# Patient Record
Sex: FEMALE | Race: WHITE | NOT HISPANIC OR LATINO | Employment: OTHER | URBAN - NONMETROPOLITAN AREA
[De-identification: names, ages, dates, MRNs, and addresses within clinical notes are randomized per-mention and may not be internally consistent; named-entity substitution may affect disease eponyms.]

---

## 2019-04-11 ENCOUNTER — HISTORICAL (OUTPATIENT)
Dept: ADMINISTRATIVE | Facility: HOSPITAL | Age: 84
End: 2019-04-11

## 2020-09-15 ENCOUNTER — HISTORICAL (OUTPATIENT)
Dept: ADMINISTRATIVE | Facility: HOSPITAL | Age: 85
End: 2020-09-15

## 2020-12-29 ENCOUNTER — HISTORICAL (OUTPATIENT)
Dept: ADMINISTRATIVE | Facility: HOSPITAL | Age: 85
End: 2020-12-29

## 2022-04-10 ENCOUNTER — HISTORICAL (OUTPATIENT)
Dept: ADMINISTRATIVE | Facility: HOSPITAL | Age: 87
End: 2022-04-10

## 2022-04-29 VITALS
WEIGHT: 176.56 LBS | DIASTOLIC BLOOD PRESSURE: 76 MMHG | BODY MASS INDEX: 29.42 KG/M2 | SYSTOLIC BLOOD PRESSURE: 126 MMHG | HEIGHT: 65 IN

## 2022-10-12 ENCOUNTER — HISTORICAL (OUTPATIENT)
Dept: ADMINISTRATIVE | Facility: HOSPITAL | Age: 87
End: 2022-10-12

## 2023-07-06 ENCOUNTER — HOSPITAL ENCOUNTER (OUTPATIENT)
Dept: RADIOLOGY | Facility: HOSPITAL | Age: 88
Discharge: HOME | End: 2023-07-06
Attending: INTERNAL MEDICINE
Payer: MEDICARE

## 2023-07-06 DIAGNOSIS — R22.1 NECK MASS: ICD-10-CM

## 2023-07-06 PROCEDURE — 70491 CT SOFT TISSUE NECK W/DYE: CPT | Mod: TC

## 2023-07-06 PROCEDURE — 25500020 PHARM REV CODE 255: Performed by: INTERNAL MEDICINE

## 2023-07-06 RX ADMIN — IOPAMIDOL 90 ML: 755 INJECTION, SOLUTION INTRAVENOUS at 02:07

## 2023-08-03 DIAGNOSIS — D00.08 CARCINOMA IN SITU OF PHARYNX: Primary | ICD-10-CM

## 2023-10-13 ENCOUNTER — HOSPITAL ENCOUNTER (EMERGENCY)
Facility: HOSPITAL | Age: 88
Discharge: HOME | End: 2023-10-13
Attending: FAMILY MEDICINE
Payer: MEDICARE

## 2023-10-13 VITALS
HEIGHT: 65 IN | SYSTOLIC BLOOD PRESSURE: 157 MMHG | TEMPERATURE: 98 F | DIASTOLIC BLOOD PRESSURE: 73 MMHG | HEART RATE: 99 BPM | OXYGEN SATURATION: 97 % | RESPIRATION RATE: 20 BRPM | BODY MASS INDEX: 24.32 KG/M2 | WEIGHT: 146 LBS

## 2023-10-13 DIAGNOSIS — K94.20 GASTROSTOMY COMPLICATION: Primary | ICD-10-CM

## 2023-10-13 DIAGNOSIS — K94.23 PEG TUBE MALFUNCTION: ICD-10-CM

## 2023-10-13 LAB
ALBUMIN SERPL-MCNC: 3.7 G/DL (ref 3.4–5)
ALBUMIN/GLOB SERPL: 1.2 RATIO
ALP SERPL-CCNC: 106 UNIT/L (ref 50–144)
ALT SERPL-CCNC: 41 UNIT/L (ref 1–45)
ANION GAP SERPL CALC-SCNC: 4 MEQ/L (ref 2–13)
AST SERPL-CCNC: 36 UNIT/L (ref 14–36)
BASOPHILS # BLD AUTO: 0.01 X10(3)/MCL (ref 0.01–0.08)
BASOPHILS NFR BLD AUTO: 0.1 % (ref 0.1–1.2)
BILIRUB SERPL-MCNC: 0.4 MG/DL (ref 0–1)
BUN SERPL-MCNC: 22 MG/DL (ref 7–20)
CALCIUM SERPL-MCNC: 9.6 MG/DL (ref 8.4–10.2)
CHLORIDE SERPL-SCNC: 96 MMOL/L (ref 98–110)
CO2 SERPL-SCNC: 32 MMOL/L (ref 21–32)
CREAT SERPL-MCNC: 0.51 MG/DL (ref 0.66–1.25)
CREAT/UREA NIT SERPL: 43 (ref 12–20)
EOSINOPHIL # BLD AUTO: 0.01 X10(3)/MCL (ref 0.04–0.36)
EOSINOPHIL NFR BLD AUTO: 0.1 % (ref 0.7–7)
ERYTHROCYTE [DISTWIDTH] IN BLOOD BY AUTOMATED COUNT: 14.6 % (ref 11–14.5)
GFR SERPLBLD CREATININE-BSD FMLA CKD-EPI: 90 MLS/MIN/1.73/M2
GLOBULIN SER-MCNC: 3.1 GM/DL (ref 2–3.9)
GLUCOSE SERPL-MCNC: 122 MG/DL (ref 70–115)
HCT VFR BLD AUTO: 36 % (ref 36–48)
HGB BLD-MCNC: 12.4 G/DL (ref 11.8–16)
IMM GRANULOCYTES # BLD AUTO: 0.04 X10(3)/MCL (ref 0–0.03)
IMM GRANULOCYTES NFR BLD AUTO: 0.5 % (ref 0–0.5)
LYMPHOCYTES # BLD AUTO: 0.31 X10(3)/MCL (ref 1.16–3.74)
LYMPHOCYTES NFR BLD AUTO: 3.9 % (ref 20–55)
MAGNESIUM SERPL-MCNC: 2.3 MG/DL (ref 1.8–2.4)
MCH RBC QN AUTO: 30.7 PG (ref 27–34)
MCHC RBC AUTO-ENTMCNC: 34.4 G/DL (ref 31–37)
MCV RBC AUTO: 89.1 FL (ref 79–99)
MONOCYTES # BLD AUTO: 0.95 X10(3)/MCL (ref 0.24–0.36)
MONOCYTES NFR BLD AUTO: 11.9 % (ref 4.7–12.5)
NEUTROPHILS # BLD AUTO: 6.67 X10(3)/MCL (ref 1.56–6.13)
NEUTROPHILS NFR BLD AUTO: 83.5 % (ref 37–73)
NRBC BLD AUTO-RTO: 0 %
PLATELET # BLD AUTO: 155 X10(3)/MCL (ref 140–371)
PMV BLD AUTO: 10 FL (ref 9.4–12.4)
POTASSIUM SERPL-SCNC: 4 MMOL/L (ref 3.5–5.1)
PROT SERPL-MCNC: 6.8 GM/DL (ref 6.3–8.2)
RBC # BLD AUTO: 4.04 X10(6)/MCL (ref 4–5.1)
SODIUM SERPL-SCNC: 132 MMOL/L (ref 135–145)
WBC # SPEC AUTO: 7.99 X10(3)/MCL (ref 4–11.5)

## 2023-10-13 PROCEDURE — 36415 COLL VENOUS BLD VENIPUNCTURE: CPT | Performed by: FAMILY MEDICINE

## 2023-10-13 PROCEDURE — 96374 THER/PROPH/DIAG INJ IV PUSH: CPT

## 2023-10-13 PROCEDURE — 83735 ASSAY OF MAGNESIUM: CPT | Performed by: FAMILY MEDICINE

## 2023-10-13 PROCEDURE — 99284 EMERGENCY DEPT VISIT MOD MDM: CPT | Mod: 25

## 2023-10-13 PROCEDURE — 80053 COMPREHEN METABOLIC PANEL: CPT | Performed by: FAMILY MEDICINE

## 2023-10-13 PROCEDURE — 85025 COMPLETE CBC W/AUTO DIFF WBC: CPT | Performed by: FAMILY MEDICINE

## 2023-10-13 PROCEDURE — 63600175 PHARM REV CODE 636 W HCPCS: Performed by: FAMILY MEDICINE

## 2023-10-13 RX ORDER — MORPHINE SULFATE 4 MG/ML
4 INJECTION, SOLUTION INTRAMUSCULAR; INTRAVENOUS
Status: COMPLETED | OUTPATIENT
Start: 2023-10-13 | End: 2023-10-13

## 2023-10-13 RX ADMIN — MORPHINE SULFATE 4 MG: 4 INJECTION, SOLUTION INTRAMUSCULAR; INTRAVENOUS at 05:10

## 2023-10-13 NOTE — ED PROVIDER NOTES
Encounter Date: 10/13/2023       History     Chief Complaint   Patient presents with    PEG TUBE PROBLEM     PT REPORTS HOME HEALTH NURSE HAD DIFFICULTY FLUSHING PEG TUBE AND AN ODOROUS FLUID CAME OUT FROM AROUND THE PEG TUBE. PT STATES CURRENTLY ON  RADIATION AND CHEMO FOR  LEFT TONSIL AND THROAT CANCER. LAST CHEMO TX YESTERDAY W/ LAST RADIATION TX TODAY. DENIES PAIN AT PEG SITE, NO REDNESS OR DRAINAGE NOTED FROM AROUND SITE.     89yo F presents with dislodged PEG. It was placed a month ago, but has not been used. She has Throat CA, and is being treated by Dr. Blanco in [facility]. PEG unable to flush since yesterday.    The history is provided by the patient and a relative.     Review of patient's allergies indicates:  No Known Allergies  Past Medical History:   Diagnosis Date    Cancer     Thyroid disease      History reviewed. No pertinent surgical history.  History reviewed. No pertinent family history.  Social History     Tobacco Use    Smoking status: Never    Smokeless tobacco: Never   Substance Use Topics    Alcohol use: Yes    Drug use: Not Currently     Review of Systems   Constitutional:  Negative for fever.   HENT:  Negative for sore throat.    Respiratory:  Negative for shortness of breath.    Cardiovascular:  Negative for chest pain.   Gastrointestinal:  Negative for nausea.   Genitourinary:  Negative for dysuria.        Dislodged PEG   Musculoskeletal:  Negative for back pain.   Skin:  Negative for rash.   Neurological:  Negative for weakness.   Hematological:  Does not bruise/bleed easily.   All other systems reviewed and are negative.      Physical Exam     Initial Vitals [10/13/23 1530]   BP Pulse Resp Temp SpO2   (!) 157/73 99 20 98.2 °F (36.8 °C) 97 %      MAP       --         Physical Exam    Nursing note and vitals reviewed.  Constitutional: Vital signs are normal. She appears well-developed and well-nourished. She is cooperative.   HENT:   Head: Normocephalic and atraumatic.   Mouth/Throat:  Oropharynx is clear and moist.   Eyes: Conjunctivae, EOM and lids are normal. Pupils are equal, round, and reactive to light.   Neck: Trachea normal. Neck supple.   Normal range of motion.  Cardiovascular:  Normal rate, regular rhythm, normal heart sounds and intact distal pulses.           Pulmonary/Chest: Breath sounds normal.   Abdominal: Abdomen is soft. Bowel sounds are normal.   L epigastric stoma noted   Musculoskeletal:         General: Normal range of motion.      Cervical back: Normal, normal range of motion and neck supple.      Lumbar back: Normal.     Neurological: She is alert and oriented to person, place, and time. She has normal strength. Coordination normal.   Skin: Skin is warm, dry and intact. Capillary refill takes less than 2 seconds.   Psychiatric: She has a normal mood and affect. Her speech is normal and behavior is normal. Judgment and thought content normal. Cognition and memory are normal.         ED Course   Procedures  Labs Reviewed   COMPREHENSIVE METABOLIC PANEL - Abnormal; Notable for the following components:       Result Value    Sodium Level 132 (*)     Chloride 96 (*)     Glucose Level 122 (*)     Blood Urea Nitrogen 22.0 (*)     Creatinine 0.51 (*)     BUN/Creatinine Ratio 43 (*)     All other components within normal limits   CBC WITH DIFFERENTIAL - Abnormal; Notable for the following components:    RDW 14.6 (*)     Neut % 83.5 (*)     Lymph % 3.9 (*)     Eos % 0.1 (*)     Lymph # 0.31 (*)     Neut # 6.67 (*)     Mono # 0.95 (*)     Eos # 0.01 (*)     IG# 0.04 (*)     All other components within normal limits   MAGNESIUM - Normal   CBC W/ AUTO DIFFERENTIAL    Narrative:     The following orders were created for panel order CBC auto differential.  Procedure                               Abnormality         Status                     ---------                               -----------         ------                     CBC with Differential[[id number]]        Abnormal             Final result                 Please view results for these tests on the individual orders.          Imaging Results    None          Medications   morphine injection 4 mg (4 mg Intravenous Given 10/13/23 0805)     Medical Decision Making  PEG unable to be replaced. 19F Carmen tried w/o success. PCP called (Dr. Moran, but Dr. Urban on call).    Amount and/or Complexity of Data Reviewed  Labs: ordered.  Discussion of management or test interpretation with external provider(s): Spoke with Dr. Correa, who stated it was not a problem to send her home with outpatient follow up.  Discussed case with Dr. Cunningham, who was on-call for Dr. Blanco the oncologist.  He stated that they would just have to put in another 1 next week.  He recommended calling Dr. Blanco's office Monday morning.    Risk  Prescription drug management.    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 Blanco  Call in 3 days               Valentino Cedeño MD  10/13/23 4252

## 2023-10-13 NOTE — ED PROVIDER NOTES
Encounter Date: 10/13/2023       History     Chief Complaint   Patient presents with    PEG TUBE PROBLEM     PT REPORTS HOME HEALTH NURSE HAD DIFFICULTY FLUSHING PEG TUBE AND AN ODOROUS FLUID CAME OUT FROM AROUND THE PEG TUBE. PT STATES CURRENTLY ON  RADIATION AND CHEMO FOR  LEFT TONSIL AND THROAT CANCER. LAST CHEMO TX YESTERDAY W/ LAST RADIATION TX TODAY. DENIES PAIN AT PEG SITE, NO REDNESS OR DRAINAGE NOTED FROM AROUND SITE.     Patient presents for evaluation of PEG tube dysfunction. Patient notes having difficulty flushing PEG tube for the past day. Patient denies having any pain at present. Patient denies having any nausea, vomiting, or any other associated symptoms at present.    The history is provided by the patient.     Review of patient's allergies indicates:  No Known Allergies  Past Medical History:   Diagnosis Date    Cancer     Thyroid disease      History reviewed. No pertinent surgical history.  History reviewed. No pertinent family history.  Social History     Tobacco Use    Smoking status: Never    Smokeless tobacco: Never   Substance Use Topics    Alcohol use: Yes    Drug use: Not Currently     Review of Systems   Constitutional: Negative.    HENT: Negative.     Eyes: Negative.    Respiratory: Negative.     Cardiovascular: Negative.    Gastrointestinal: Negative.    Endocrine: Negative.    Genitourinary: Negative.         PEG Tube Dysfunction   Musculoskeletal: Negative.    Allergic/Immunologic: Negative.    Neurological: Negative.    Hematological: Negative.    Psychiatric/Behavioral: Negative.         Physical Exam     Initial Vitals [10/13/23 1530]   BP Pulse Resp Temp SpO2   (!) 157/73 99 20 98.2 °F (36.8 °C) 97 %      MAP       --         Physical Exam    Constitutional: She appears well-developed and well-nourished.   HENT:   Head: Normocephalic and atraumatic.   Eyes: Conjunctivae and EOM are normal. Pupils are equal, round, and reactive to light.   Neck: Neck supple.   Normal range of  motion.  Cardiovascular:  Normal rate.           Pulmonary/Chest: Breath sounds normal.   Abdominal: Abdomen is soft. Bowel sounds are normal.   PEG Tube dysfunctional   Musculoskeletal:         General: Normal range of motion.      Cervical back: Normal range of motion and neck supple.     Neurological: She is alert.   Skin: Skin is warm and dry.         ED Course   Procedures  Labs Reviewed   COMPREHENSIVE METABOLIC PANEL - Abnormal; Notable for the following components:       Result Value    Sodium Level 132 (*)     Chloride 96 (*)     Glucose Level 122 (*)     Blood Urea Nitrogen 22.0 (*)     Creatinine 0.51 (*)     BUN/Creatinine Ratio 43 (*)     All other components within normal limits   CBC WITH DIFFERENTIAL - Abnormal; Notable for the following components:    RDW 14.6 (*)     Neut % 83.5 (*)     Lymph % 3.9 (*)     Eos % 0.1 (*)     Lymph # 0.31 (*)     Neut # 6.67 (*)     Mono # 0.95 (*)     Eos # 0.01 (*)     IG# 0.04 (*)     All other components within normal limits   MAGNESIUM - Normal   CBC W/ AUTO DIFFERENTIAL    Narrative:     The following orders were created for panel order CBC auto differential.  Procedure                               Abnormality         Status                     ---------                               -----------         ------                     CBC with Differential[[id number]]        Abnormal            Final result                 Please view results for these tests on the individual orders.          Imaging Results    None          Medications   morphine injection 4 mg (4 mg Intravenous Given 10/13/23 6825)     Medical Decision Making  Amount and/or Complexity of Data Reviewed  Labs: ordered.    Risk  Prescription drug management.                               Clinical Impression:   Final diagnoses:  [K94.23] PEG tube malfunction  [K94.23] PEG tube malfunction - Add Gastrografin               Andrea Francisco MD  10/13/23 6597

## 2023-11-24 ENCOUNTER — LAB REQUISITION (OUTPATIENT)
Dept: LAB | Facility: HOSPITAL | Age: 88
End: 2023-11-24
Payer: MEDICARE

## 2023-11-24 DIAGNOSIS — I10 ESSENTIAL (PRIMARY) HYPERTENSION: ICD-10-CM

## 2023-11-24 LAB
ALBUMIN SERPL-MCNC: 3.5 G/DL (ref 3.4–5)
ALBUMIN/GLOB SERPL: 1.1 RATIO
ALP SERPL-CCNC: 125 UNIT/L (ref 50–144)
ALT SERPL-CCNC: 37 UNIT/L (ref 1–45)
ANION GAP SERPL CALC-SCNC: 3 MEQ/L (ref 2–13)
AST SERPL-CCNC: 41 UNIT/L (ref 14–36)
BASOPHILS # BLD AUTO: 0.01 X10(3)/MCL (ref 0.01–0.08)
BASOPHILS NFR BLD AUTO: 0.3 % (ref 0.1–1.2)
BILIRUB SERPL-MCNC: 0.6 MG/DL (ref 0–1)
BUN SERPL-MCNC: 21 MG/DL (ref 7–20)
CALCIUM SERPL-MCNC: 9.4 MG/DL (ref 8.4–10.2)
CHLORIDE SERPL-SCNC: 102 MMOL/L (ref 98–110)
CO2 SERPL-SCNC: 32 MMOL/L (ref 21–32)
CREAT SERPL-MCNC: 0.6 MG/DL (ref 0.66–1.25)
CREAT/UREA NIT SERPL: 35 (ref 12–20)
EOSINOPHIL # BLD AUTO: 0.02 X10(3)/MCL (ref 0.04–0.36)
EOSINOPHIL NFR BLD AUTO: 0.7 % (ref 0.7–7)
ERYTHROCYTE [DISTWIDTH] IN BLOOD BY AUTOMATED COUNT: 18.6 % (ref 11–14.5)
GFR SERPLBLD CREATININE-BSD FMLA CKD-EPI: 87 MLS/MIN/1.73/M2
GLOBULIN SER-MCNC: 3.3 GM/DL (ref 2–3.9)
GLUCOSE SERPL-MCNC: 124 MG/DL (ref 70–115)
HCT VFR BLD AUTO: 35.7 % (ref 36–48)
HGB BLD-MCNC: 12.3 G/DL (ref 11.8–16)
IMM GRANULOCYTES # BLD AUTO: 0.01 X10(3)/MCL (ref 0–0.03)
IMM GRANULOCYTES NFR BLD AUTO: 0.3 % (ref 0–0.5)
LYMPHOCYTES # BLD AUTO: 0.54 X10(3)/MCL (ref 1.16–3.74)
LYMPHOCYTES NFR BLD AUTO: 17.8 % (ref 20–55)
MCH RBC QN AUTO: 32.5 PG (ref 27–34)
MCHC RBC AUTO-ENTMCNC: 34.5 G/DL (ref 31–37)
MCV RBC AUTO: 94.4 FL (ref 79–99)
MONOCYTES # BLD AUTO: 0.42 X10(3)/MCL (ref 0.24–0.36)
MONOCYTES NFR BLD AUTO: 13.8 % (ref 4.7–12.5)
NEUTROPHILS # BLD AUTO: 2.04 X10(3)/MCL (ref 1.56–6.13)
NEUTROPHILS NFR BLD AUTO: 67.1 % (ref 37–73)
NRBC BLD AUTO-RTO: 0 %
PLATELET # BLD AUTO: 209 X10(3)/MCL (ref 140–371)
PMV BLD AUTO: 10.5 FL (ref 9.4–12.4)
POTASSIUM SERPL-SCNC: 4.9 MMOL/L (ref 3.5–5.1)
PROT SERPL-MCNC: 6.8 GM/DL (ref 6.3–8.2)
RBC # BLD AUTO: 3.78 X10(6)/MCL (ref 4–5.1)
SODIUM SERPL-SCNC: 137 MMOL/L (ref 135–145)
TSH SERPL-ACNC: 62.2 UIU/ML (ref 0.36–3.74)
WBC # SPEC AUTO: 3.04 X10(3)/MCL (ref 4–11.5)

## 2023-11-24 PROCEDURE — 84443 ASSAY THYROID STIM HORMONE: CPT | Performed by: INTERNAL MEDICINE

## 2023-11-24 PROCEDURE — 80053 COMPREHEN METABOLIC PANEL: CPT | Performed by: INTERNAL MEDICINE

## 2023-11-24 PROCEDURE — 85025 COMPLETE CBC W/AUTO DIFF WBC: CPT | Performed by: INTERNAL MEDICINE

## 2024-01-03 ENCOUNTER — LAB REQUISITION (OUTPATIENT)
Dept: LAB | Facility: HOSPITAL | Age: 89
End: 2024-01-03
Payer: MEDICARE

## 2024-01-03 DIAGNOSIS — I10 ESSENTIAL (PRIMARY) HYPERTENSION: ICD-10-CM

## 2024-01-03 LAB
ALBUMIN SERPL-MCNC: 4 G/DL (ref 3.4–5)
ALBUMIN/GLOB SERPL: 1.3 RATIO
ALP SERPL-CCNC: 110 UNIT/L (ref 50–144)
ALT SERPL-CCNC: 33 UNIT/L (ref 1–45)
ANION GAP SERPL CALC-SCNC: 6 MEQ/L (ref 2–13)
AST SERPL-CCNC: 39 UNIT/L (ref 14–36)
BASOPHILS # BLD AUTO: 0.03 X10(3)/MCL (ref 0.01–0.08)
BASOPHILS NFR BLD AUTO: 0.7 % (ref 0.1–1.2)
BILIRUB SERPL-MCNC: 0.5 MG/DL (ref 0–1)
BUN SERPL-MCNC: 23 MG/DL (ref 7–20)
CALCIUM SERPL-MCNC: 10 MG/DL (ref 8.4–10.2)
CHLORIDE SERPL-SCNC: 103 MMOL/L (ref 98–110)
CO2 SERPL-SCNC: 31 MMOL/L (ref 21–32)
CREAT SERPL-MCNC: 0.53 MG/DL (ref 0.66–1.25)
CREAT/UREA NIT SERPL: 43 (ref 12–20)
EOSINOPHIL # BLD AUTO: 0.07 X10(3)/MCL (ref 0.04–0.36)
EOSINOPHIL NFR BLD AUTO: 1.7 % (ref 0.7–7)
ERYTHROCYTE [DISTWIDTH] IN BLOOD BY AUTOMATED COUNT: 13 % (ref 11–14.5)
GFR SERPLBLD CREATININE-BSD FMLA CKD-EPI: 90 MLS/MIN/1.73/M2
GLOBULIN SER-MCNC: 3.2 GM/DL (ref 2–3.9)
GLUCOSE SERPL-MCNC: 106 MG/DL (ref 70–115)
HCT VFR BLD AUTO: 36.8 % (ref 36–48)
HGB BLD-MCNC: 12.4 G/DL (ref 11.8–16)
IMM GRANULOCYTES # BLD AUTO: 0.01 X10(3)/MCL (ref 0–0.03)
IMM GRANULOCYTES NFR BLD AUTO: 0.2 % (ref 0–0.5)
LYMPHOCYTES # BLD AUTO: 0.7 X10(3)/MCL (ref 1.16–3.74)
LYMPHOCYTES NFR BLD AUTO: 16.9 % (ref 20–55)
MCH RBC QN AUTO: 33.3 PG (ref 27–34)
MCHC RBC AUTO-ENTMCNC: 33.7 G/DL (ref 31–37)
MCV RBC AUTO: 98.9 FL (ref 79–99)
MONOCYTES # BLD AUTO: 0.53 X10(3)/MCL (ref 0.24–0.36)
MONOCYTES NFR BLD AUTO: 12.8 % (ref 4.7–12.5)
NEUTROPHILS # BLD AUTO: 2.8 X10(3)/MCL (ref 1.56–6.13)
NEUTROPHILS NFR BLD AUTO: 67.7 % (ref 37–73)
NRBC BLD AUTO-RTO: 0 %
PLATELET # BLD AUTO: 205 X10(3)/MCL (ref 140–371)
PMV BLD AUTO: 11.4 FL (ref 9.4–12.4)
POTASSIUM SERPL-SCNC: 4.5 MMOL/L (ref 3.5–5.1)
PROT SERPL-MCNC: 7.2 GM/DL (ref 6.3–8.2)
RBC # BLD AUTO: 3.72 X10(6)/MCL (ref 4–5.1)
SODIUM SERPL-SCNC: 140 MMOL/L (ref 135–145)
WBC # SPEC AUTO: 4.14 X10(3)/MCL (ref 4–11.5)

## 2024-01-03 PROCEDURE — 80053 COMPREHEN METABOLIC PANEL: CPT | Performed by: INTERNAL MEDICINE

## 2024-01-03 PROCEDURE — 85025 COMPLETE CBC W/AUTO DIFF WBC: CPT | Performed by: INTERNAL MEDICINE

## 2024-01-03 PROCEDURE — 84443 ASSAY THYROID STIM HORMONE: CPT | Performed by: INTERNAL MEDICINE

## 2024-01-03 PROCEDURE — 84439 ASSAY OF FREE THYROXINE: CPT | Performed by: INTERNAL MEDICINE

## 2024-01-05 LAB
T4 FREE SERPL-MCNC: 4.23 NG/DL (ref 0.78–2.19)
TSH SERPL-ACNC: <0.07 UIU/ML (ref 0.36–3.74)

## 2024-04-25 ENCOUNTER — CLINICAL SUPPORT (OUTPATIENT)
Dept: REHABILITATION | Facility: HOSPITAL | Age: 89
End: 2024-04-25
Attending: OTOLARYNGOLOGY
Payer: MEDICARE

## 2024-04-25 ENCOUNTER — HOSPITAL ENCOUNTER (OUTPATIENT)
Dept: RADIOLOGY | Facility: HOSPITAL | Age: 89
Discharge: HOME | End: 2024-04-25
Attending: OTOLARYNGOLOGY
Payer: MEDICARE

## 2024-04-25 DIAGNOSIS — R13.12 OROPHARYNGEAL DYSPHAGIA: ICD-10-CM

## 2024-04-25 PROCEDURE — 92611 MOTION FLUOROSCOPY/SWALLOW: CPT

## 2024-04-25 PROCEDURE — A9698 NON-RAD CONTRAST MATERIALNOC: HCPCS | Performed by: OTOLARYNGOLOGY

## 2024-04-25 PROCEDURE — 74230 X-RAY XM SWLNG FUNCJ C+: CPT | Mod: TC

## 2024-04-25 PROCEDURE — 25500020 PHARM REV CODE 255: Performed by: OTOLARYNGOLOGY

## 2024-04-25 RX ADMIN — BARIUM SULFATE 5 ML: 0.81 POWDER, FOR SUSPENSION ORAL at 10:04

## 2024-04-25 RX ADMIN — BARIUM SULFATE 5 ML: 400 SUSPENSION ORAL at 10:04

## 2024-04-25 NOTE — PROGRESS NOTES
[facility]  MODIFIED BARIUM SWALLOW STUDY  Outpatient    Date: 04/25/2024     Name: Wilma J Lejeune   MRN: [id number]    Therapy Diagnosis: Moderate Oropharyngeal Dysphagia    Considerations for recommending MBSS:   Patient was referred for a Modified Barium Swallow Study to assess the efficiency of his/her swallow function, rule out aspiration and make recommendations regarding safe dietary consistencies, effective compensatory strategies, and safe eating environment.     Procedure Min.   Swallow and Oral Function Tx       Fl Modified Barium Swallow Speech  60 min     Precautions: Fall and Aspiration      History and Interview      Date of Onset: ?August 2023 following dx of throat cancer.     Past Medical History: Wilma J Lejeune  has a past medical history of Cancer and Thyroid disease.  Wilma J Lejeune  has no past surgical history on file.    The patient is a 89 y.o. female who complains of difficulty swallowing solids and difficulty swallowing liquids. Pt has Dx of Throat Cancer. Underwent chemo/radiation in November of 2023. She began experiencing dysphagia at that time. PEG placed in September 2023 due to Pt's inability to swallow (per Daughter's report). Pt is currently not using the PEG as she is trying to maintain her nutrition/hydration by mouth. She is only able to tolerate liquids and purees. Pt and Daughter admit that she often coughs/chokes while eating. MBS was ordered by Dr. Campos to further assess dysphagia.     The Pt and Step Daughter gave the following history:   -Current diet at home: Puree and thin liquids  -Recommended diet from previous study: No previous MBS completed  -Therapy received: No previous ST received  -Neurological: Pt denied any neurological diagnoses.  -Gastroenterologist (GI) : Pt denied any GI diagnoses.  Pt has PEG tube placed in September of 2023. Not currently using. Pt/family flushing.   -Pulmonary: Pt denied any pulmonary diagnoses.   -Surgery:  "n/a  -Cancer: Throat cancer    The following observations were made:   -Mental status: Alert, Cooperative, and Very Jamul.  -Factors affecting performance: no difficulties participating in the study  -Feeding Method: independent in self-feeding    Respiratory Status:   -Respiratory Status: room air    Medical Hx and Allergies:  Review of patient's allergies indicates:  No Known Allergies    Pain Scale:  0/10    Pain Location: No pain reported    Subjective     Pt REPORT / INTERVIEW: Pt has Dx of Throat Cancer. Underwent chemo/radiation in November of 2023. She began experiencing dysphagia at that time. PEG placed in September 2023 due to Pt's inability to swallow (per Daughter's report). Pt is currently not using the PEG as she is trying to maintain her nutrition/hydration by mouth. She is only able to tolerate liquids and purees. Pt and Daughter admit that she often coughs/chokes while eating. MBS was ordered by Dr. Campos to further assess dysphagia.   ORIENTATION: x3, Jamul  AFFECT: appropriate  GENERAL COGNITIVE IMPRESSION: Intact        Objective     MODIFIED BARIUM SWALLOW STUDY:  A modified barium swallow study was ordered to objectively evaluate the safety and efficiency of the patients swallowing, rule out aspiration and determine intervention recommendations.      The patient was seen in radiology seated in High Espinoza's position in standard chair for a lateral and anterior view of the swallow mechanism. The study was conducted using Varibar thin liquid (IDDSI 0), Varibar nectar liquid (IDDSI 2), Varibar pudding (IDDSI 4). Solids were not presented due to severity of dysphagia. She tolerated the procedure well.     Consistencies administered and features assessed:  Blank cells represent efficient and effective features of the swallow:  Grey cells represent consistencies/trials not presented.        Thin liquid (IDDSI 0) -  5ml via spoon x1 Thin liquid (IDDSI 0) - "typical sip"  X4  Via straw Thin liquid " "(IDDSI 0) - consecutive cup sips x3sips Thin liquid (IDDSI 0)- bolus hold x1 Mildly thick (IDDSI 2)-   "Typical sip"  x4 Moderately thick (IDDSI 3) -   "Typical sip" x4 Puree (IDDSI 4) - spoonful x3 Soft Solid (IDDSI 6) - bite x3 Hard Solid (IDDSI 7) - bite x3 Mixed Consistency - spoonful x3 Comments   Comments / Observations           Solids were not admin due to severity of swallow.   Bolus prep + mastication              Bolus transport + lingual motion              Oral residue               Tongue base retraction Reduced Reduced   Reduced  Reduced       Initiation of pharyngeal swallow Delayed to Pyriforms Delayed to pyriforms   Delayed to pyriforms  Delayed to valleculae       Soft Palate elevation               Anterior hyoid excursion Reduced Reduced   Reduced  Reduced       Epiglottic movement Absent  Absent   Absent  Absent       Laryngeal elevation /Laryngeal vestibule closure Reduced Reduced   Reduced  Reduced       Pharyngeal stripping wave Diminished  Diminished    Diminished   Diminished        Pharyngeal contraction Reduced Reduced   Reduced  Reduced       Pharyngoesophageal segment opening + clearance Min distension/min duration-marked obstruction Min distension/min duration-marked obstruction   Min distension/min duration-marked obstruction  Min distension/min duration-marked obstruction       Pharyngeal residue Mild-diffuse Mild-diffuse   Mild-diffuse  Moderate-valleculae and pyriforms       Esophageal clearance               PAS  7 7   2 & 7(residue)  1       Strategies attempted  Chin tuck  L/R head turn L/R head turn     Chin tuck         RATING SCALES:  Penetration Aspiration Scale (PAS) pertinent findngs:   1.        Material does not enter airway  2.        Material enters the airway, remains above the vocal folds,             and is ejected from the airway.  3.        Material enters the airway, remains above the vocal folds,             and is not ejected from the airway.  4         " Material enters the airway, contacts the vocal folds,             and is ejected from the airway.  5.        Material enters the airway, contacts the vocal folds,             and is not ejected from the airway.  6.        Material enters the ariway, passes below the vocal folds,             and is ejected into the larynx or out of the airway.  7.        Material enters the airway, passes below the vocal folds,             and is not ejected from the trachea despite effort.  8.        Material enters the airway, passes below the vocal folds,             and no effort is made to eject.  Source:  SWAPNIL Brown, MARITO Nice, Abdullahi ALMANZA, PRAMOD Novak, & PRAMOD Jj.  A Penetration-Aspiration Scale.  Dysphagia  11:93-98, 1996.    Education     Education: Results of the study and Recommendations were discussed with the patient and daughter. Patient expressed understanding. HEP program given as well as thickener. Extensive education provided.    SLP educated Pt and Daughter (Vivien) at length on MBSS results and diet recommendations. SLP allowing Pt/Daughter to view MBSS via video unit within depth explanation of anatomy and function of swallow. SLP providing visual evidence of aspiration.  All questions and comments addressed and comprehension demonstrated.    Barriers to Learning:  [facility]    Teaching Method: Verbal, Written, Audio/Visual    Assessment     Wilma J Lejeune is a 89 y.o. female referred for Modified Barium Swallow Study with a medical diagnosis of Throat CA. The patient presents with moderate oropharyngeal dysphagia as determined by the Dysphagia Outcome and Severity Scale (SHARAN). Level 3: Moderate Dysphagia.    Modified Barium Swallow Study (MBSS) revealed oral phase characterized by adequate lingual and labial strength and range of motion for tongue control, bolus preparation and transport. Lip closure was adequate with no labial escape. Bolus prep and mastication was not tested as solids were not given due to  severity of dysphagia, risk for aspiration.  Lingual motion was delayed. There was no significant oral residue. The swallow was initiated when the head of the bolus entered the pyriform sinuses with liquids and valleculae with purees.     Pharyngeal phase characterized by delayed initiation of swallow across consistencies. The soft palate elevated for complete of the velopharyngeal port. During pharyngeal swallow, there was significantly reduced base of tongue retraction, anterior hyoid excursion, laryngeal elevation, and pharyngeal stripping wave resulted in no epiglottic inversion and min UES opening. There was mild diffuse residue in the pharynx post swallow with both thin and thick liquids, Increase in residue with more viscous material like puree. Mild aspiration of residual material was noted with thin liquids, Trace aspiration x 2 of mildly/nectar thick liquids. Multiple swallows per bolus-assisted with bolus clearance as well as clearance of residue Chin tuck-did not eliminate aspiration Head turn to the left-did not eliminate aspiration Head turn to the right-did not eliminate aspiration.     On esophageal screen,  no proximal esophageal varices or retrograde noted.       Pt presents with Moderate oropharyngeal Dysphagia mainly characterized by Reduced BOT Retraction, Swallow Delay, Reduced Anterior Hyoid Excursion, Reduced Laryngeal Elevation, No Epiglottic Inversion, Diminished Pharyngeal Stripping Wave, Reduced Pharyngeal Contraction, Reduced PES Opening, Diffuse Pharyngeal Residue, and Moderate Pharyngeal Residue. There was aspiration noted with Thin liquids and Mildly/Nectar Thick liquids. Aspiration was trace with NTL and gross with thin liquids. aspiration  occurred after the swallow on residual material that remained in the pharynx.  Multiple swallows, chin tuck, and L/R head turn  were introduced without success at reducing/eliminating penetration/aspiration. Patient appears to be at low risk for  aspiration related PNA in consideration of three pillars of aspiration pneumonia (Etna, 2005) including good oral health status, overall health/immune status, and poor laryngeal vestibule closure/severity of dysphagia. Results and recommendations were discussed at length with the Pt, highlighting the areas of concerns. Pt was provided with handouts regarding HEP, diet recommendations, and thickener. Pt verbalized understanding.     Pt presents with the following Pillars of Pneumonia (Di, 2008)   Risk factors Present (yes/no) Comments   Impaired health status No    Poor oral health  No    High prevalence of dysphagia risk factors yes        Dysphagia Outcome and Severity Scale (SHARAN): Level 3: Moderate Dysphagia  Level 7: Normal in all situations  Level 6: WFL/Mod I  Level 5: Mild Dysphagia  Level 4: Mild-Mod Dysphagia  Level 3: Moderate Dysphagia  Level 2: Moderate-Severe Dysphagia  Level 1: Severe Dysphagia  (See Dysphagia 14:139-145, 1999 for more details on scaling)    Dysphagia outcome and severity scale  __________________________________________________________________________________________________________________________________________    Full per-oral nutrition (P.O): Normal diet   __________________________________________________________________________________________________________________________________________    Level 7:  Normal in all situations   Normal diet   No strategies or extra time needed     Level 6:  Within functional limits/modified independence  Normal diet, functional swallow   Patient may have mild oral or pharyngeal delay, retention or trace epiglottal undercoating but independently and spontaneously compensates/clears   May need extra time for meal   Have no aspiration or penetration across consistencies     Full P.O: Modified diet and/or independence    _________________________________________________________________________________________________________________________________________    Level 5: Mild dysphagia: Distant supervision, may need one diet consistency restricted   May exhibit one or more of the following   Aspiration of thin liquids only but with strong reflexive cough to clear completely   Airway penetration midway to cords with one or more consistency or to cords with one consistency but clears spontaneously   Retention in pharynx that is cleared spontaneously Mild oral dysphagia with reduced mastication and/or oral retention that is cleared spontaneously     Level 4: Mild-moderate dysphagia: Intermittent supervision/cueing, one or two consistencies restricted   May exhibit one or more of the following  Retention in pharynx cleared with cue   Retention in the oral cavity that is cleared with cue   Aspiration with one consistency, with weak or no reflexive cough   Or airway penetration to the level of the vocal cords with cough with two consistencies   Or airway penetration to the level of the vocal cords without cought with one consistency     Level 3: Moderate dysphagia: Total assist, supervision, or strategies, two or more diet consistencies restricted   May exhibit one or more of the following   Moderate retention in pharynx, cleared with cue   Moderate retention in oral cavity, cleared with cue   Airway penetration to the level of the vocal cords without cough with two or more consistencies   Or aspiration with two consistencies, with weak or no reflexive cough   Or aspiration with one consistency, no cough and airway penetration to cords with one, no cough     Nonoral nutrition necessary   ____________________________________________________________________________________________________________________________________________    Level 2: Moderately severe dysphagia: Maximum assistance or use of strategies with partial P.O. only (tolerates  at least one consistency safely with total use of strategies)   May exhibit one or more of the following   Severe retention in pharynx, unable to clear or needs multiple cues   Severe oral stage bolus loss or retention, unable to clear or needs multiple cues   Aspiration with two or more consistencies, no reflexive cough, weak volitional cough   Or aspiration with one or more consistency, no cough and airway penetration to cords with one or more consistency, no cough     Level 1: Severe dysphagia: NPO: Unable to tolerate any P.O. safely   May exhibit one or more of the following   Severe retention in pharynx, unable to clear   Severe oral stage bolus loss or retention, unable to clear   Silent aspiration with two or more consistencies, nonfunctional volitional cough   Or unable to achieve swallow  _____________________________________________________________________________________________________________________________________________    (HOLLY Henson et al, 1999) Dysphagia Outcome and Severity Scale       Recommendations:     Consistency Recommendations:  Mildly thick/Nectar liquids (IDDSI 2) and Puree (thinner) consistencies (IDDSI 4).  Free water protocol, allowing thin water following oral care.   Precautions:sit as upright as possible, upright 30 minutes after meals, alternate food and liquid, decrease bite/drink size, swallow 2 times, and frequent oral care  Risk Management: use good oral hygiene , sit upright for all PO intake, and increase physical mobility as tolerated  Specialist Referrals: Dietician consult secondary to risk for malnutrition and dehydration. Pt may need supplementation via PEG tube.  Ancillary Tests: n/a  Therapy: Dysphagia therapy is recommended including Supraglottic Swallow, Super-Supraglottic Swallow, Mendelsohn, Priti, Expiratory Muscle Strength Training (EMST), NMES, MDPT, and effortful swallow. Pt reports that she receives , please add ST to HH orders. If ST unavailable in   setting, Pt would need to be referred for outpatient ST.   Follow-up exam: Follow up instrumental should be completed following 4-6 weeks of dysphagia therapy.      Please contact [facility] at [phone] if there are questions re: the above.    Therapist's Name:   Judith Garcia CCC-SLP   Speech-Language Pathologist    Date: 4/25/2024

## 2025-05-23 ENCOUNTER — LAB VISIT (OUTPATIENT)
Dept: LAB | Facility: HOSPITAL | Age: 89
End: 2025-05-23
Attending: INTERNAL MEDICINE
Payer: MEDICARE

## 2025-05-23 DIAGNOSIS — E03.2 IATROGENIC HYPOTHYROIDISM: Primary | ICD-10-CM

## 2025-05-23 LAB
ALBUMIN SERPL-MCNC: 3.9 G/DL (ref 3.4–5)
ALBUMIN/GLOB SERPL: 1.4 RATIO
ALP SERPL-CCNC: 94 UNIT/L (ref 50–144)
ALT SERPL-CCNC: 21 UNIT/L (ref 1–45)
ANION GAP SERPL CALC-SCNC: 0 MEQ/L (ref 2–13)
AST SERPL-CCNC: 27 UNIT/L (ref 14–36)
BASOPHILS # BLD AUTO: 0.02 X10(3)/MCL (ref 0.01–0.08)
BASOPHILS NFR BLD AUTO: 0.6 % (ref 0.1–1.2)
BILIRUB SERPL-MCNC: 0.7 MG/DL (ref 0–1)
BUN SERPL-MCNC: 26 MG/DL (ref 7–20)
CALCIUM SERPL-MCNC: 9.8 MG/DL (ref 8.4–10.2)
CHLORIDE SERPL-SCNC: 108 MMOL/L (ref 98–110)
CO2 SERPL-SCNC: 30 MMOL/L (ref 21–32)
CREAT SERPL-MCNC: 0.69 MG/DL (ref 0.66–1.25)
CREAT/UREA NIT SERPL: 38 (ref 12–20)
EOSINOPHIL # BLD AUTO: 0.09 X10(3)/MCL (ref 0.04–0.36)
EOSINOPHIL NFR BLD AUTO: 2.5 % (ref 0.7–7)
ERYTHROCYTE [DISTWIDTH] IN BLOOD BY AUTOMATED COUNT: 12.6 % (ref 11–14.5)
GFR SERPLBLD CREATININE-BSD FMLA CKD-EPI: 83 ML/MIN/1.73/M2
GLOBULIN SER-MCNC: 2.8 GM/DL (ref 2–3.9)
GLUCOSE SERPL-MCNC: 100 MG/DL (ref 70–115)
HCT VFR BLD AUTO: 36.7 % (ref 36–48)
HGB BLD-MCNC: 12.5 G/DL (ref 11.8–16)
IMM GRANULOCYTES # BLD AUTO: 0.01 X10(3)/MCL (ref 0–0.03)
IMM GRANULOCYTES NFR BLD AUTO: 0.3 % (ref 0–0.5)
LYMPHOCYTES # BLD AUTO: 0.58 X10(3)/MCL (ref 1.16–3.74)
LYMPHOCYTES NFR BLD AUTO: 16.3 % (ref 20–55)
MCH RBC QN AUTO: 32.6 PG (ref 27–34)
MCHC RBC AUTO-ENTMCNC: 34.1 G/DL (ref 31–37)
MCV RBC AUTO: 95.8 FL (ref 79–99)
MONOCYTES # BLD AUTO: 0.36 X10(3)/MCL (ref 0.24–0.36)
MONOCYTES NFR BLD AUTO: 10.1 % (ref 4.7–12.5)
NEUTROPHILS # BLD AUTO: 2.49 X10(3)/MCL (ref 1.56–6.13)
NEUTROPHILS NFR BLD AUTO: 70.2 % (ref 37–73)
NRBC BLD AUTO-RTO: 0 %
PLATELET # BLD AUTO: 123 X10(3)/MCL (ref 140–371)
PMV BLD AUTO: 12.4 FL (ref 9.4–12.4)
POTASSIUM SERPL-SCNC: 4.2 MMOL/L (ref 3.5–5.1)
PROT SERPL-MCNC: 6.7 GM/DL (ref 6.3–8.2)
RBC # BLD AUTO: 3.83 X10(6)/MCL (ref 4–5.1)
SODIUM SERPL-SCNC: 138 MMOL/L (ref 136–145)
TSH SERPL-ACNC: 3.97 UIU/ML (ref 0.36–3.74)
WBC # BLD AUTO: 3.55 X10(3)/MCL (ref 4–11.5)

## 2025-05-23 PROCEDURE — 80053 COMPREHEN METABOLIC PANEL: CPT

## 2025-05-23 PROCEDURE — 85025 COMPLETE CBC W/AUTO DIFF WBC: CPT

## 2025-05-23 PROCEDURE — 84443 ASSAY THYROID STIM HORMONE: CPT

## 2025-05-23 PROCEDURE — 36415 COLL VENOUS BLD VENIPUNCTURE: CPT

## 2025-07-21 ENCOUNTER — HOSPITAL ENCOUNTER (OUTPATIENT)
Dept: RADIOLOGY | Facility: HOSPITAL | Age: 89
Discharge: HOME | End: 2025-07-21
Attending: INTERNAL MEDICINE
Payer: MEDICARE

## 2025-07-21 DIAGNOSIS — M25.531 RIGHT WRIST PAIN: ICD-10-CM

## 2025-07-21 PROCEDURE — 73110 X-RAY EXAM OF WRIST: CPT | Mod: TC,RT
